# Patient Record
Sex: FEMALE | Race: WHITE | Employment: FULL TIME | URBAN - METROPOLITAN AREA
[De-identification: names, ages, dates, MRNs, and addresses within clinical notes are randomized per-mention and may not be internally consistent; named-entity substitution may affect disease eponyms.]

---

## 2019-01-22 ENCOUNTER — OFFICE VISIT (OUTPATIENT)
Dept: OBGYN CLINIC | Age: 54
End: 2019-01-22
Payer: COMMERCIAL

## 2019-01-22 VITALS
DIASTOLIC BLOOD PRESSURE: 62 MMHG | HEIGHT: 63 IN | SYSTOLIC BLOOD PRESSURE: 104 MMHG | WEIGHT: 156 LBS | HEART RATE: 68 BPM | BODY MASS INDEX: 27.64 KG/M2

## 2019-01-22 DIAGNOSIS — Z12.31 VISIT FOR SCREENING MAMMOGRAM: ICD-10-CM

## 2019-01-22 DIAGNOSIS — Z11.51 SCREENING FOR HPV (HUMAN PAPILLOMAVIRUS): ICD-10-CM

## 2019-01-22 DIAGNOSIS — Z01.419 VISIT FOR GYNECOLOGIC EXAMINATION: Primary | ICD-10-CM

## 2019-01-22 PROCEDURE — 99386 PREV VISIT NEW AGE 40-64: CPT | Performed by: OBSTETRICS & GYNECOLOGY

## 2019-01-22 RX ORDER — BISOPROLOL FUMARATE 5 MG/1
TABLET ORAL
Refills: 0 | COMMUNITY
Start: 2019-01-12

## 2019-01-31 LAB — PAP SMEAR: NORMAL

## 2019-02-18 ENCOUNTER — HOSPITAL ENCOUNTER (OUTPATIENT)
Dept: WOMENS IMAGING | Age: 54
Discharge: HOME | End: 2019-02-20
Payer: COMMERCIAL

## 2019-02-18 DIAGNOSIS — Z12.31 VISIT FOR SCREENING MAMMOGRAM: ICD-10-CM

## 2019-02-18 PROCEDURE — 77067 SCR MAMMO BI INCL CAD: CPT

## 2019-02-21 PROBLEM — Z12.31 VISIT FOR SCREENING MAMMOGRAM: Status: RESOLVED | Noted: 2019-01-22 | Resolved: 2019-02-21

## 2019-02-21 PROBLEM — Z01.419 VISIT FOR GYNECOLOGIC EXAMINATION: Status: RESOLVED | Noted: 2019-01-22 | Resolved: 2019-02-21

## 2019-02-21 PROBLEM — Z11.51 SCREENING FOR HPV (HUMAN PAPILLOMAVIRUS): Status: RESOLVED | Noted: 2019-01-22 | Resolved: 2019-02-21

## 2022-12-06 ENCOUNTER — OFFICE VISIT (OUTPATIENT)
Dept: OBGYN CLINIC | Age: 57
End: 2022-12-06
Payer: COMMERCIAL

## 2022-12-06 VITALS
DIASTOLIC BLOOD PRESSURE: 72 MMHG | WEIGHT: 144 LBS | HEART RATE: 64 BPM | BODY MASS INDEX: 25.52 KG/M2 | HEIGHT: 63 IN | SYSTOLIC BLOOD PRESSURE: 118 MMHG

## 2022-12-06 DIAGNOSIS — Z11.51 SCREENING FOR HPV (HUMAN PAPILLOMAVIRUS): ICD-10-CM

## 2022-12-06 DIAGNOSIS — Z01.419 VISIT FOR GYNECOLOGIC EXAMINATION: Primary | ICD-10-CM

## 2022-12-06 DIAGNOSIS — N94.10 DYSPAREUNIA, FEMALE: ICD-10-CM

## 2022-12-06 DIAGNOSIS — Z12.31 VISIT FOR SCREENING MAMMOGRAM: ICD-10-CM

## 2022-12-06 PROCEDURE — 99396 PREV VISIT EST AGE 40-64: CPT | Performed by: OBSTETRICS & GYNECOLOGY

## 2022-12-06 RX ORDER — MELOXICAM 15 MG/1
TABLET ORAL
COMMUNITY
Start: 2022-09-25

## 2022-12-06 RX ORDER — ESTRADIOL 0.1 MG/G
1 CREAM VAGINAL DAILY
Qty: 1 EACH | Refills: 6 | Status: SHIPPED | OUTPATIENT
Start: 2022-12-06

## 2022-12-06 NOTE — PROGRESS NOTES
Postmenopausal Annual    Subjective:     Keyanna Flaherty is a 61y.o. year old female    female who presents today for her annual well woman exam. The patient is sexually active. The patient is not taking hormone replacement therapy. Patient denies post-menopausal vaginal bleeding. The patient has regular exercise: yes  Mentions a point of tenderness at the introitus , has been hurting over the last year, affecting sexual activity . Vitals:  /72 (Site: Left Upper Arm, Position: Sitting, Cuff Size: Medium Adult)   Pulse 64   Ht 5' 3\" (1.6 m)   Wt 144 lb (65.3 kg)   BMI 25.51 kg/m²   Allergies:  Patient has no known allergies. No past medical history on file. Past Surgical History:   Procedure Laterality Date    HYSTERECTOMY, VAGINAL      KNEE SURGERY      left    OVARY REMOVAL      left after tubal preg     Family History   Problem Relation Age of Onset    Cancer Mother         breast     Social History     Socioeconomic History    Marital status:      Spouse name: Not on file    Number of children: Not on file    Years of education: Not on file    Highest education level: Not on file   Occupational History    Occupation: co ordinator   Tobacco Use    Smoking status: Former    Smokeless tobacco: Never   Substance and Sexual Activity    Alcohol use: Yes     Comment: social    Drug use: No    Sexual activity: Yes   Other Topics Concern    Not on file   Social History Narrative    Not on file     Social Determinants of Health     Financial Resource Strain: Not on file   Food Insecurity: Not on file   Transportation Needs: Not on file   Physical Activity: Not on file   Stress: Not on file   Social Connections: Not on file   Intimate Partner Violence: Not on file   Housing Stability: Not on file         Last mammogram 2 mnth - normal   Breast cancer risk factors : mother - 71 yo . Chemo       Last Pap 4 yrs normal     No LMP recorded. Patient has had a hysterectomy.       Age at menopause onset: early 46 s   Menopausal symptom assessment:  Vasomotor symptoms: occasional   Urinary incontinence &  symptoms: denies   Sexual dysfunction: denies   Present Hormonalmedications: none     Osteoporosis risk assessment : neg   Last bone mineral density : not indicated     History of abnormal lipids: yes - on meds   Hypertension - hx PVCs uses BP meds. Stroke/Joy    Yearly flu vaccine recommended for persons aged 48 and older. Colonoscopy up-to-date    Review of Systems  Review of Systems  All other systems reviewed and are negative. Review of Systems   Constitutional: Negative for chills and fever. Respiratory: Negative for cough and shortness of breath. Cardiovascular: Negative for chest painand palpitations. Gastrointestinal: Negative for nausea and vomiting. Genitourinary: Negative for dysuria, frequency and urgency. Musculoskeletal: Negative formyalgias. Neurological: Negative for dizziness, seizures and headaches. Psychiatric/Behavioral: Negative for depression and suicidal ideas.     :     Vitals:  /72 (Site: Left Upper Arm, Position: Sitting, Cuff Size: Medium Adult)   Pulse 64   Ht 5' 3\" (1.6 m)   Wt 144 lb (65.3 kg)   BMI 25.51 kg/m²     Physical Exam  Physical Exam    Constitutional: She is oriented to person, place, and time and well-developed, well-nourished, and in nodistress. HENT:   Head: Normocephalic and atraumatic. Eyes: Conjunctivae are normal. Pupils are equal, round, and reactive to light. Neck: Normal range ofmotion. Neck supple. Cardiovascular: Normal rate and regular rhythm. Pulmonary/Chest: Effort normal and breath sounds normal. No respiratory distress. Right breast exhibits noinverted nipple, no mass, no nipple discharge, no skin change and no tenderness. Left breast exhibits no inverted nipple, no mass, no nipple discharge, no skin change and no tenderness. Breasts are symmetrical.   Abdominal: Soft.  Bowel sounds are normal. Genitourinary: Vagina normal, uterus normal and cervix normal. No vaginal discharge found. Musculoskeletal: Normal range ofmotion. Neurological: She is alert and oriented to person, place, and time. She has normal reflexes. Psychiatric: Memory, affect andjudgment normal.       Assessment:      Diagnosis Orders   1. Visit for gynecologic examination        2. Screening for HPV (human papillomavirus)        3. Visit for screening mammogram        4. Dyspareunia, female            Body mass index is 25.51 kg/m². Obesity:  Overweight  Smoking:  No    Plan:   PAP SMEAR : indicated:  performed. Breast exam : Normal   Dyspareunia: Physical exam discussed with patient, no abnormalities noted on vaginal exam.  Discussed with patient symptoms of atrophic vaginitis, patient to start vaginal estrogen cream, and to follow-up in 3-month to check for resolution of symptoms. Patient also advised to use coconut oil also to be applied vaginally when the cream is not being used, as needed or even has a lubricant during intercourse due to its moisturizing properties and vitamin D content and bacteriostatic properties. Obesity Counseling:  Given  Smoking Counseling:N/A    No orders of the defined types were placed in this encounter. Orders Placed This Encounter   Medications    estradiol (ESTRACE VAGINAL) 0.1 MG/GM vaginal cream     Sig: Place 1 g vaginally daily     Dispense:  1 each     Refill:  6       Follow up:  Return in about 3 months (around 3/6/2023) for medication assessment. Seth Swanson MD        HEALTH MAINTENANCE:   Health information given. Women's Health patient information and counselling done. regarding risk/benefits/alternatives to Hormone Therapy and need for yearly re-evaluation. Periodic Pap smear discussed. Mammogram recommended yearly. Colon cancer screening by age 48 & every 5-10years.   Bone mineral density (by age 72 or sooner if indication it would affect Hormone Therapy management or other risk factors for osteoporosis). Aleena Emerson M.D., [email or web address]. G

## 2023-04-12 DIAGNOSIS — Z00.00 ENCOUNTER FOR GENERAL ADULT MEDICAL EXAMINATION WITHOUT ABNORMAL FINDINGS: ICD-10-CM

## 2023-04-12 DIAGNOSIS — E78.5 HYPERLIPIDEMIA, UNSPECIFIED: ICD-10-CM

## 2023-04-13 RX ORDER — ROSUVASTATIN CALCIUM 10 MG/1
TABLET, COATED ORAL
Qty: 90 TABLET | Refills: 1 | Status: SHIPPED | OUTPATIENT
Start: 2023-04-13 | End: 2023-06-16 | Stop reason: ALTCHOICE

## 2023-04-13 RX ORDER — ASPIRIN 81 MG/1
TABLET ORAL
Qty: 90 TABLET | Refills: 1 | Status: SHIPPED | OUTPATIENT
Start: 2023-04-13 | End: 2023-06-16 | Stop reason: ALTCHOICE

## 2023-04-14 NOTE — TELEPHONE ENCOUNTER
Please schedule an appointment for medicine is gone have sent in 90-day supply.  In the event that you cannot get in before this 90-day supply please call my office.Thanks much Dr. Larios.

## 2023-06-16 ENCOUNTER — OFFICE VISIT (OUTPATIENT)
Dept: PRIMARY CARE | Facility: CLINIC | Age: 58
End: 2023-06-16
Payer: COMMERCIAL

## 2023-06-16 VITALS
HEART RATE: 55 BPM | RESPIRATION RATE: 18 BRPM | TEMPERATURE: 97 F | SYSTOLIC BLOOD PRESSURE: 119 MMHG | BODY MASS INDEX: 26.51 KG/M2 | OXYGEN SATURATION: 97 % | DIASTOLIC BLOOD PRESSURE: 80 MMHG | WEIGHT: 149.6 LBS | HEIGHT: 63 IN

## 2023-06-16 DIAGNOSIS — Z12.31 BREAST CANCER SCREENING BY MAMMOGRAM: Primary | ICD-10-CM

## 2023-06-16 DIAGNOSIS — Z00.00 HEALTHCARE MAINTENANCE: ICD-10-CM

## 2023-06-16 DIAGNOSIS — Z80.8 FAMILY HISTORY OF MELANOMA: ICD-10-CM

## 2023-06-16 PROBLEM — M79.645 BILATERAL THUMB PAIN: Status: ACTIVE | Noted: 2023-06-16

## 2023-06-16 PROBLEM — E78.5 HYPERLIPIDEMIA: Status: ACTIVE | Noted: 2023-06-16

## 2023-06-16 PROBLEM — J93.9 PNEUMOTHORAX: Status: ACTIVE | Noted: 2023-06-16

## 2023-06-16 PROBLEM — L65.9 ALOPECIA: Status: ACTIVE | Noted: 2023-06-16

## 2023-06-16 PROBLEM — M79.644 BILATERAL THUMB PAIN: Status: ACTIVE | Noted: 2023-06-16

## 2023-06-16 PROBLEM — I49.8 BIGEMINY: Status: ACTIVE | Noted: 2023-06-16

## 2023-06-16 PROBLEM — M70.61 GREATER TROCHANTERIC BURSITIS OF BOTH HIPS: Status: ACTIVE | Noted: 2023-06-16

## 2023-06-16 PROBLEM — M70.62 GREATER TROCHANTERIC BURSITIS OF BOTH HIPS: Status: ACTIVE | Noted: 2023-06-16

## 2023-06-16 PROBLEM — R00.2 PALPITATIONS: Status: ACTIVE | Noted: 2023-06-16

## 2023-06-16 PROBLEM — R19.8 CHANGE IN BOWEL FUNCTION: Status: ACTIVE | Noted: 2023-06-16

## 2023-06-16 PROBLEM — E55.9 VITAMIN D DEFICIENCY: Status: ACTIVE | Noted: 2023-06-16

## 2023-06-16 PROCEDURE — 1036F TOBACCO NON-USER: CPT | Performed by: FAMILY MEDICINE

## 2023-06-16 PROCEDURE — 99396 PREV VISIT EST AGE 40-64: CPT | Performed by: FAMILY MEDICINE

## 2023-06-16 RX ORDER — BISOPROLOL FUMARATE 5 MG/1
5 TABLET, FILM COATED ORAL 2 TIMES DAILY
COMMUNITY
End: 2023-08-02

## 2023-06-16 NOTE — PATIENT INSTRUCTIONS
Please consider exercise program involving walking or some other form of aerobic activity 5 days weekly for 30 minutes... Let's also consider strengthening of large muscle groups like the abdominal muscles or shoulder muscles... Twice weekly with reps of 5/10/15 exercises and gradually increase strength.. This is not heavy strength training but light weight training... Sit ups or back exercise routine.. Please ask for handout if uncertain how to do..This  will help to strengthen your muscles which in turn will help you to lose weight.... You might ask what is the best diet available.. I would strongly encourage you to consider  Weight Watchers.. And as  your  fellow on  Weight Watchers physician attempting to  live this  LIFE  style  choice with you....  I will be glad to give you recommendations on what to eat.. Consider buying Sindhu bread.., kandis bagle thin bread.. oikos yogurt... eggs  to eat as hard-boiled... Halo top ice cream for snack... All these are delicious foods which.. when eaten and  being compliant eating three  meals daily  breakfast lunch and dinner, drinking  64 ounces of water daily we will all win together !!!!!!!. This will be a means for you to lose weight... Consider also the smart phone letty ... My plate.. Or My  fitness  pal..,  as additional possibilities for weight loss... Good  lucwendy Larios!    Discussed medication side effects.  The  risk benefits and treatment options  discussed with patient.       Please schedule follow-up appointment based upon your improvement/failure to improve/chronic medical conditions and physician recommendations during office appointment at the .       Patient advised to go to er if symptoms worsen or to call answering service, or to return to office for additional evaluation    This note was partially  generated using Dragon voice recognition and there may be incorrect words, wording, spelling, or pronunciation errors that were not  corrected prior to committing the note to the medical record.     Please consider the following medications to help    mitigate  Covid during this time  Vitamin C 500 mg  TWICE daily  B complex daily  ZINC   30 - 50  MG  DAILY     VITAMIN D 3   200O IU DAILY        Multivitamin with zinc      Cologuard dated 8/8/2022 was negative.  Lipids dated 4/14/2021 with cholesterol 152 HDL 63 LDL 66 triglycerides 113 Numbers outstanding hepatitis C antibody was nonreactive same date vitamin D same date was 66 with magnesium 2.0 CBC with differential with no anemia WBC 6.0 platelets 259 and CMP showing normal liver function studies normal GFR complete physical examination okay

## 2023-06-16 NOTE — PROGRESS NOTES
Jasper Curran is a 58 y.o. female here today for  complete physical examination     HPI   Here for complete physical exam patient denies complaints at this time examination    Current Outpatient Medications:     bisoprolol (Zebeta) 5 mg tablet, Take 1 tablet (5 mg) by mouth 2 times a day., Disp: , Rfl:     Past Medical History:   Diagnosis Date    Hyperlipidemia     Hypertension        Past Surgical History:   Procedure Laterality Date    KNEE SURGERY      OTHER SURGICAL HISTORY  12/31/2019    Hysterectomy       Family History   Problem Relation Name Age of Onset    Breast cancer Mother      Melanoma Father      Thyroid cancer Sister         Social History     Tobacco Use    Smoking status: Never    Smokeless tobacco: Never   Vaping Use    Vaping Use: Never used   Substance Use Topics    Alcohol use: Yes     Alcohol/week: 3.0 standard drinks of alcohol     Types: 3 Cans of beer per week    Drug use: Never       Immunization History   Administered Date(s) Administered    Pfizer Purple Cap SARS-CoV-2 03/19/2021, 04/09/2021, 11/23/2021   Constitutional; no fever no chills no recent weight gain and no recent weight loss.  eyes:; no loss of vision no discharge from the eyes and no itching of the eyes.  ENT; no neck pain symptoms no ear symptoms and no nasal symptoms.  Cardiovascular; no chest pain no palpitations and no lower extremity edema.  Respiratory; no shortness of breath no cough and no shortness of breath during exertion.  gastrointestinal; no abdominal pain no constipation no heartburn no vomiting no blood in stools and bowel movement frequency normal.  genitourinary; negative dysuria no hematuria and no pyuria.  Musculoskeletal; no arthralgias and no myalgias.  integumentary;.. No skin lesions  Neurologic. no headaches and no dizziness  hematologic/lymphatic; swollen glands no tendency for easy bleeding and no tendency for easy bruising  Psychiatric; no anxiety no depression and no emotional problems.        Objective    Visit Vitals  /80   Pulse 55   Temp 36.1 °C (97 °F)   Resp 18       Physical Exam   Vitals: I have reviewed the vitals  General: Well-developed.  In no acute distress.  Eyes:   sclera nonicteric.  Conjunctiva not injected.  No discharge.   HEAD: Normocephalic, atraumatic.  HEENT   Mucous membranes moist.  Posterior oropharynx nonerythematous, no tonsillar exudates.      No cervical lymphadenopathy.  Cardio: Regular rate and rhythm.  No murmur, rub or gallop.  Pulmonary: Lungs clear to auscultation in all fields.  No accessory muscle use.  GI/: Normal active bowel sounds.  Soft, nontender.  No masses or organomegaly appreciated.  Musculoskeletal: No gross deformities appreciated.  Neuro: Alert, age-appropriate.  Normal muscle tone.  Moving all extremities.  Skin: No rash, bruises or lesions.     Assessment      E-cigarette/Vaping       Questions Responses    E-cigarette/Vaping Use Never User    Passive Exposure No    Counseling Given No          E-cigarette/Vaping Substances       Questions Responses    Nicotine No    THC No    CBD No    Flavoring No          E-cigarette/Vaping Devices       Questions Responses    Disposable No    Pre-filled or Refillable Cartridge No    Refillable Tank No    Pre-filled Pod No        Cologuard dated 8/8/2022 was negative.  Lipids dated 4/14/2021 with cholesterol 152 HDL 63 LDL 66 triglycerides 113 Numbers outstanding hepatitis C antibody was nonreactive same date vitamin D same date was 66 with magnesium 2.0 CBC with differential with no anemia WBC 6.0 platelets 259 and CMP showing normal liver function studies normal GFR complete physical examination okay    Assess/Plan SmartLinks:   Problem List Items Addressed This Visit       Healthcare maintenance    Relevant Orders    Lipid Panel    Glucose, fasting     Other Visit Diagnoses       Breast cancer screening by mammogram    -  Primary    Relevant Orders    BI mammo bilateral screening tomosynthesis     Family history of melanoma        Relevant Orders    Referral to Dermatology                 Airway patent, Nasal mucosa clear. Mouth with normal mucosa. Throat has no vesicles, no oropharyngeal exudates and uvula is midline.

## 2023-07-27 NOTE — RESULT ENCOUNTER NOTE
PLEASE CALL Jasper Curran AND LET HER KNOW THE MAMMOGRAM IS NORMAL.  PLEASE PLAN ON REPEATING IN ONE YEAR.

## 2023-08-02 DIAGNOSIS — R00.2 PALPITATIONS: ICD-10-CM

## 2023-08-02 PROBLEM — I49.1 PAC (PREMATURE ATRIAL CONTRACTION): Status: ACTIVE | Noted: 2020-04-02

## 2023-08-02 RX ORDER — MELOXICAM 15 MG/1
1 TABLET ORAL DAILY
COMMUNITY
Start: 2022-09-25

## 2023-08-02 RX ORDER — ASPIRIN 81 MG/1
81 TABLET ORAL
COMMUNITY

## 2023-08-02 RX ORDER — BISOPROLOL FUMARATE 5 MG/1
5 TABLET, FILM COATED ORAL 2 TIMES DAILY
Qty: 180 TABLET | Refills: 1 | Status: SHIPPED | OUTPATIENT
Start: 2023-08-02 | End: 2024-02-12

## 2023-08-02 RX ORDER — ROSUVASTATIN CALCIUM 5 MG/1
1 TABLET, COATED ORAL DAILY
COMMUNITY
Start: 2022-08-31

## 2023-08-02 RX ORDER — MULTIVITAMIN
1 TABLET ORAL
COMMUNITY

## 2024-02-12 DIAGNOSIS — R00.2 PALPITATIONS: ICD-10-CM

## 2024-02-12 RX ORDER — BISOPROLOL FUMARATE 5 MG/1
5 TABLET, FILM COATED ORAL 2 TIMES DAILY
Qty: 180 TABLET | Refills: 1 | Status: SHIPPED | OUTPATIENT
Start: 2024-02-12

## 2024-07-08 ENCOUNTER — APPOINTMENT (OUTPATIENT)
Dept: PRIMARY CARE | Facility: CLINIC | Age: 59
End: 2024-07-08
Payer: COMMERCIAL

## 2024-07-08 VITALS
SYSTOLIC BLOOD PRESSURE: 124 MMHG | DIASTOLIC BLOOD PRESSURE: 73 MMHG | HEIGHT: 63 IN | RESPIRATION RATE: 18 BRPM | OXYGEN SATURATION: 97 % | BODY MASS INDEX: 26.5 KG/M2 | HEART RATE: 55 BPM | TEMPERATURE: 97 F

## 2024-07-08 DIAGNOSIS — D22.9 CHANGE IN COLOR OF SKIN MOLE: ICD-10-CM

## 2024-07-08 DIAGNOSIS — Z00.00 HEALTHCARE MAINTENANCE: Primary | ICD-10-CM

## 2024-07-08 PROCEDURE — 99396 PREV VISIT EST AGE 40-64: CPT | Performed by: FAMILY MEDICINE

## 2024-07-08 PROCEDURE — 1036F TOBACCO NON-USER: CPT | Performed by: FAMILY MEDICINE

## 2024-07-08 ASSESSMENT — ENCOUNTER SYMPTOMS
ARTHRALGIAS: 1
DIARRHEA: 0
HEADACHES: 0
SEIZURES: 0
RECTAL PAIN: 0
WHEEZING: 0
SORE THROAT: 0
POLYPHAGIA: 0
NECK PAIN: 0
SINUS PAIN: 0
ABDOMINAL PAIN: 0
JOINT SWELLING: 0
LIGHT-HEADEDNESS: 0
FATIGUE: 0
CONSTIPATION: 0
TREMORS: 0
BACK PAIN: 0
PHOTOPHOBIA: 0
HEMATURIA: 0
DIZZINESS: 0
RHINORRHEA: 0
PALPITATIONS: 1
CHEST TIGHTNESS: 0
POLYDIPSIA: 0
BLOOD IN STOOL: 0
BRUISES/BLEEDS EASILY: 0
APNEA: 0
DYSURIA: 0
COUGH: 0
FREQUENCY: 0
CHOKING: 0

## 2024-07-08 NOTE — PROGRESS NOTES
Jasper Curran is a 59 y.o. female here today a periodic health exam.  I reviewed previous preventative health measures including screening tests, immunizations and labs.      HPI   CURRENT COMPLAINTS OR CONCERNS:         PREVIOUS PREVENTATIVE HEALTH  Colonoscopy : NO  Cologuard : YES -- DATE 8/22  Mammogram : YES -- DATE 7/23  Pap Test :  YES -- DATE 12/22  Hepatitis C Antibody : YES -- DATE 12/21  HIV Screening : NO  Prevnar : NO  Tdap : YES -- DATE 2015  Shingrix : NO  Yearly Flu Shot : NO    CURRENT FINDINGS  Healthy Diet : YES  Exercise :  NO  Depression Issues : NO  Alcohol Use : YES --  weekend  Tobacco Use : NO        Current Outpatient Medications:     bisoprolol (Zebeta) 5 mg tablet, TAKE 1 TABLET BY MOUTH TWICE A DAY, Disp: 180 tablet, Rfl: 1    multivitamin tablet, Take 1 tablet by mouth once daily., Disp: , Rfl:     Past Medical History:   Diagnosis Date    Hyperlipidemia     Hypertension     Palpitation        Past Surgical History:   Procedure Laterality Date    KNEE SURGERY      OTHER SURGICAL HISTORY  12/31/2019    Hysterectomy    PLEURAL SCARIFICATION         Family History   Problem Relation Name Age of Onset    Breast cancer Mother      Melanoma Father      Thyroid cancer Sister         Social History     Tobacco Use    Smoking status: Never    Smokeless tobacco: Former   Vaping Use    Vaping status: Never Used   Substance Use Topics    Alcohol use: Yes     Alcohol/week: 3.0 standard drinks of alcohol     Types: 3 Cans of beer per week     Comment: social    Drug use: Never       Immunization History   Administered Date(s) Administered    Pfizer Purple Cap SARS-CoV-2 03/19/2021, 04/09/2021, 11/23/2021       Review of Systems   Constitutional:  Negative for fatigue.   HENT:  Negative for congestion, dental problem, ear pain, nosebleeds, rhinorrhea, sinus pain and sore throat.    Eyes:  Negative for photophobia.   Respiratory:  Negative for apnea, cough, choking, chest tightness and wheezing.     Cardiovascular:  Positive for palpitations. Negative for chest pain.   Gastrointestinal:  Negative for abdominal pain, blood in stool, constipation, diarrhea and rectal pain.   Endocrine: Negative for cold intolerance, heat intolerance, polydipsia, polyphagia and polyuria.   Genitourinary:  Negative for dysuria, frequency, hematuria, pelvic pain and vaginal discharge.   Musculoskeletal:  Positive for arthralgias. Negative for back pain, joint swelling and neck pain.   Skin:  Negative for rash.   Allergic/Immunologic: Negative for food allergies.   Neurological:  Negative for dizziness, tremors, seizures, syncope, light-headedness and headaches.   Hematological:  Does not bruise/bleed easily.         Objective    Visit Vitals  /73   Pulse 55   Temp 36.1 °C (97 °F)   Resp 18       Physical Exam  Vitals reviewed.   Constitutional:       Appearance: Normal appearance.   HENT:      Head: Normocephalic.      Right Ear: External ear normal.      Left Ear: External ear normal.      Nose: Nose normal.      Mouth/Throat:      Mouth: Mucous membranes are moist.   Eyes:      Conjunctiva/sclera: Conjunctivae normal.   Cardiovascular:      Rate and Rhythm: Regular rhythm.      Heart sounds: Normal heart sounds.   Pulmonary:      Effort: Pulmonary effort is normal.      Breath sounds: Normal breath sounds.   Abdominal:      General: Bowel sounds are normal.      Palpations: Abdomen is soft.   Musculoskeletal:         General: Normal range of motion.      Cervical back: Neck supple.   Skin:     General: Skin is warm and dry.   Neurological:      General: No focal deficit present.      Mental Status: She is alert and oriented to person, place, and time.   Psychiatric:         Behavior: Behavior normal.         Judgment: Judgment normal.            Assessment    1. Healthcare maintenance  Lipid Panel, CBC and Auto Differential, Glucose, fasting, Comprehensive Metabolic Panel      2. Change in color of skin mole  Referral to  Dermatology

## 2024-07-08 NOTE — PATIENT INSTRUCTIONS

## 2024-08-30 DIAGNOSIS — R00.2 PALPITATIONS: ICD-10-CM

## 2024-08-30 RX ORDER — BISOPROLOL FUMARATE 5 MG/1
5 TABLET, FILM COATED ORAL 2 TIMES DAILY
Qty: 180 TABLET | Refills: 1 | Status: SHIPPED | OUTPATIENT
Start: 2024-08-30

## 2025-02-28 DIAGNOSIS — R00.2 PALPITATIONS: ICD-10-CM

## 2025-02-28 RX ORDER — BISOPROLOL FUMARATE 5 MG/1
5 TABLET, FILM COATED ORAL 2 TIMES DAILY
Qty: 180 TABLET | Refills: 1 | Status: SHIPPED | OUTPATIENT
Start: 2025-02-28

## 2025-07-16 ENCOUNTER — APPOINTMENT (OUTPATIENT)
Dept: PRIMARY CARE | Facility: CLINIC | Age: 60
End: 2025-07-16
Payer: COMMERCIAL

## 2025-07-16 VITALS
RESPIRATION RATE: 18 BRPM | SYSTOLIC BLOOD PRESSURE: 106 MMHG | OXYGEN SATURATION: 98 % | WEIGHT: 143 LBS | TEMPERATURE: 97 F | HEART RATE: 61 BPM | HEIGHT: 63 IN | DIASTOLIC BLOOD PRESSURE: 66 MMHG | BODY MASS INDEX: 25.34 KG/M2

## 2025-07-16 DIAGNOSIS — R10.12 LEFT UPPER QUADRANT ABDOMINAL PAIN: ICD-10-CM

## 2025-07-16 DIAGNOSIS — Z00.00 HEALTHCARE MAINTENANCE: ICD-10-CM

## 2025-07-16 DIAGNOSIS — I49.1 PAC (PREMATURE ATRIAL CONTRACTION): Primary | ICD-10-CM

## 2025-07-16 DIAGNOSIS — Z12.31 BREAST CANCER SCREENING BY MAMMOGRAM: ICD-10-CM

## 2025-07-16 DIAGNOSIS — Z12.11 SCREENING FOR COLON CANCER: ICD-10-CM

## 2025-07-16 LAB
POC BILIRUBIN, URINE: NEGATIVE
POC BLOOD, URINE: NEGATIVE
POC COLOR, URINE: YELLOW
POC GLUCOSE, URINE: NEGATIVE MG/DL
POC KETONES, URINE: NEGATIVE MG/DL
POC LEUKOCYTES, URINE: NEGATIVE
POC NITRITE,URINE: NEGATIVE
POC PH, URINE: 6 PH
POC PROTEIN, URINE: NEGATIVE MG/DL
POC SPECIFIC GRAVITY, URINE: 1.02
POC UROBILINOGEN, URINE: 0.2 EU/DL

## 2025-07-16 PROCEDURE — 99214 OFFICE O/P EST MOD 30 MIN: CPT | Performed by: FAMILY MEDICINE

## 2025-07-16 PROCEDURE — 3008F BODY MASS INDEX DOCD: CPT | Performed by: FAMILY MEDICINE

## 2025-07-16 PROCEDURE — 99396 PREV VISIT EST AGE 40-64: CPT | Performed by: FAMILY MEDICINE

## 2025-07-16 PROCEDURE — 81003 URINALYSIS AUTO W/O SCOPE: CPT | Performed by: FAMILY MEDICINE

## 2025-07-16 PROCEDURE — 90471 IMMUNIZATION ADMIN: CPT | Performed by: FAMILY MEDICINE

## 2025-07-16 PROCEDURE — 93000 ELECTROCARDIOGRAM COMPLETE: CPT | Performed by: FAMILY MEDICINE

## 2025-07-16 PROCEDURE — 90715 TDAP VACCINE 7 YRS/> IM: CPT | Performed by: FAMILY MEDICINE

## 2025-07-16 RX ORDER — FAMOTIDINE 20 MG/1
20 TABLET, FILM COATED ORAL 2 TIMES DAILY
Qty: 60 TABLET | Refills: 5 | Status: SHIPPED | OUTPATIENT
Start: 2025-07-16 | End: 2026-01-12

## 2025-07-16 ASSESSMENT — ENCOUNTER SYMPTOMS
HEMATURIA: 0
FATIGUE: 0
SORE THROAT: 0
DIARRHEA: 0
CHEST TIGHTNESS: 0
LIGHT-HEADEDNESS: 1
ABDOMINAL PAIN: 1
BLOOD IN STOOL: 0
PALPITATIONS: 1
VOMITING: 0
HEADACHES: 0
ARTHRALGIAS: 0
SINUS PAIN: 0
BELCHING: 0
FLANK PAIN: 0
BRUISES/BLEEDS EASILY: 0
DYSURIA: 0
WEIGHT LOSS: 0
FREQUENCY: 0
BACK PAIN: 0
POLYDIPSIA: 0
COUGH: 0
FLATUS: 0
NAUSEA: 0
POLYPHAGIA: 0
DIZZINESS: 0
NERVOUS/ANXIOUS: 0

## 2025-07-16 ASSESSMENT — PATIENT HEALTH QUESTIONNAIRE - PHQ9
2. FEELING DOWN, DEPRESSED OR HOPELESS: NOT AT ALL
SUM OF ALL RESPONSES TO PHQ9 QUESTIONS 1 AND 2: 0
1. LITTLE INTEREST OR PLEASURE IN DOING THINGS: NOT AT ALL

## 2025-07-16 NOTE — PATIENT INSTRUCTIONS

## 2025-07-16 NOTE — PROGRESS NOTES
Jasper Curran is a 60 y.o. female here today a periodic health exam.  I reviewed previous preventative health measures including screening tests, immunizations and labs.      Abdominal Pain  This is a recurrent problem. The current episode started more than 1 year ago. The onset quality is gradual. The problem occurs daily. The problem has been unchanged. The pain is located in the epigastric region. The pain is at a severity of 1/10. The quality of the pain is burning. Pertinent negatives include no arthralgias, belching, diarrhea, dysuria, flatus, frequency, headaches, hematuria, melena, nausea, vomiting or weight loss. The pain is aggravated by certain positions. The pain is relieved by Nothing. She has tried nothing for the symptoms.      CURRENT COMPLAINTS OR CONCERNS:   Skin abrasion right hand question last tetanus  Would like labs done as recommended by annual physical for work  Admits to palpitations    PREVIOUS PREVENTATIVE HEALTH  Colonoscopy : NO  Cologuard : YES -- DATE 8/8/2022  Mammogram : YES -- DATE 6/16/2023  Pap Test :  YES -- DATE 12/22 with Pap performed  Hepatitis C Antibody : YES -- DATE 4/14/2021  HIV Screening : NO  Prevnar : NO  Tdap : YES -- DATE 9/11/15  Shingrix : NO  Yearly Flu Shot : NO    CURRENT FINDINGS  Healthy Diet : YES  Exercise :  NO ...walking dog    Depression Issues : NO  Alcohol Use : YES --  3 social   Tobacco Use : NO      Current Medications[1]    Medical History[2]    Surgical History[3]    Family History[4]    Social History[5]    Immunization History   Administered Date(s) Administered    COVID-19, mRNA, LNP-S, PF, 30 mcg/0.3 mL dose 03/19/2021, 04/09/2021, 11/23/2021       Review of Systems   Constitutional:  Negative for fatigue and weight loss.   HENT:  Negative for ear pain, sinus pain and sore throat.    Respiratory:  Negative for cough and chest tightness.    Cardiovascular:  Positive for palpitations. Negative for chest pain.   Gastrointestinal:  Positive for  abdominal pain. Negative for blood in stool, diarrhea, flatus, melena, nausea and vomiting.   Endocrine: Negative for cold intolerance, heat intolerance, polydipsia, polyphagia and polyuria.   Genitourinary:  Negative for dysuria, flank pain, frequency and hematuria.   Musculoskeletal:  Negative for arthralgias and back pain.   Skin:  Negative for rash.   Neurological:  Positive for light-headedness. Negative for dizziness, syncope and headaches.   Hematological:  Does not bruise/bleed easily.   Psychiatric/Behavioral:  The patient is not nervous/anxious.          Objective    Visit Vitals  /66   Pulse 61   Temp 36.1 °C (97 °F)   Resp 18       Physical Exam  Vitals reviewed.   Constitutional:       Appearance: Normal appearance.   HENT:      Head: Normocephalic.      Right Ear: External ear normal.      Left Ear: External ear normal.      Nose: Nose normal.      Mouth/Throat:      Mouth: Mucous membranes are moist.     Eyes:      Conjunctiva/sclera: Conjunctivae normal.       Cardiovascular:      Rate and Rhythm: Regular rhythm.      Heart sounds: Normal heart sounds.   Pulmonary:      Effort: Pulmonary effort is normal.      Breath sounds: Normal breath sounds.   Abdominal:      General: Bowel sounds are normal.      Palpations: Abdomen is soft.     Musculoskeletal:         General: Normal range of motion.      Cervical back: Neck supple.     Skin:     General: Skin is warm and dry.     Neurological:      General: No focal deficit present.      Mental Status: She is alert and oriented to person, place, and time.     Psychiatric:         Behavior: Behavior normal.         Judgment: Judgment normal.            Assessment    1. PAC (premature atrial contraction)        2. Left upper quadrant abdominal pain  CBC and Auto Differential, Amylase, Lipase, Comprehensive Metabolic Panel, US abdomen complete, POCT UA Automated manually resulted, XR abdomen 1 view, Sedimentation Rate, CBC and Auto Differential, Amylase,  Lipase, Comprehensive Metabolic Panel, Sedimentation Rate      3. Healthcare maintenance  Glucose, fasting, Lipid panel, Glucose, fasting, Lipid panel, Referral to Gynecology      4. Screening for colon cancer  Colonoscopy Screening; High Risk Patient      5. Breast cancer screening by mammogram  BI mammo bilateral screening tomosynthesis      Monitor palpitations if persistent or recurrent additional workup avoid caffeine    Blood  pressure  is   stable Monitor your blood pressure at home and notify if blood pressure consistently over 140 systolic 90 diastolic   See dentist twice yearly  Get checked yearly  30 minutes of moderate intensity exercise 5 days weekly  Application of sunscreen every 2 hours during peak times  Regular sleep schedule  At least 8 hours nightly of sleep..  Limit TV/screen time 30 minutes prior to bedtime   EKG shows  Normal sinus rhythm  Normal axis  Nonspecific ST segment changes  Poor R wave progression       [1]   Current Outpatient Medications:     bisoprolol (Zebeta) 5 mg tablet, Take 1 tablet (5 mg) by mouth 2 times a day., Disp: 180 tablet, Rfl: 1    multivitamin tablet, Take 1 tablet by mouth once daily., Disp: , Rfl:   [2]   Past Medical History:  Diagnosis Date    Hyperlipidemia     Hypertension     Palpitation    [3]   Past Surgical History:  Procedure Laterality Date    KNEE SURGERY      OTHER SURGICAL HISTORY  2019    Hysterectomy    PLEURAL SCARIFICATION     [4]   Family History  Problem Relation Name Age of Onset    Breast cancer Mother      Melanoma Father      Thyroid cancer Sister     [5]   Social History  Tobacco Use    Smoking status: Former     Current packs/day: 0.00     Average packs/day: 1 pack/day for 19.0 years (19.0 ttl pk-yrs)     Types: Cigarettes     Start date:      Quit date:      Years since quittin.5    Smokeless tobacco: Never   Vaping Use    Vaping status: Never Used   Substance Use Topics    Alcohol use: Yes     Alcohol/week: 3.0  standard drinks of alcohol     Types: 3 Cans of beer per week     Comment: social    Drug use: Never

## 2025-07-16 NOTE — ASSESSMENT & PLAN NOTE
Patient has left upper quadrant abdominal pain about years duration fairly constant sometimes he has palpitations may or may not be associated EKG ordered with results showing findings of normal sinus rhythm axis nonspecific ST segment changes no definite change in comparison with EKG dated i.e. old EKG.

## 2025-07-21 ENCOUNTER — APPOINTMENT (OUTPATIENT)
Dept: RADIOLOGY | Facility: CLINIC | Age: 60
End: 2025-07-21
Payer: COMMERCIAL

## 2025-07-21 ENCOUNTER — HOSPITAL ENCOUNTER (OUTPATIENT)
Dept: RADIOLOGY | Facility: CLINIC | Age: 60
Discharge: HOME | End: 2025-07-21
Payer: COMMERCIAL

## 2025-07-21 DIAGNOSIS — R10.12 LEFT UPPER QUADRANT ABDOMINAL PAIN: ICD-10-CM

## 2025-07-21 PROCEDURE — 76700 US EXAM ABDOM COMPLETE: CPT | Performed by: RADIOLOGY

## 2025-07-21 PROCEDURE — 74018 RADEX ABDOMEN 1 VIEW: CPT

## 2025-07-21 PROCEDURE — 74018 RADEX ABDOMEN 1 VIEW: CPT | Performed by: STUDENT IN AN ORGANIZED HEALTH CARE EDUCATION/TRAINING PROGRAM

## 2025-07-21 PROCEDURE — 76700 US EXAM ABDOM COMPLETE: CPT

## 2025-07-25 DIAGNOSIS — R10.12 LEFT UPPER QUADRANT ABDOMINAL PAIN: Primary | ICD-10-CM

## 2025-07-25 RX ORDER — OMEPRAZOLE 40 MG/1
40 CAPSULE, DELAYED RELEASE ORAL
Qty: 30 CAPSULE | Refills: 2 | Status: SHIPPED | OUTPATIENT
Start: 2025-07-25 | End: 2025-08-24

## 2025-07-25 NOTE — PROGRESS NOTES
I WILL PUT YOU  ON  OMEPRAZOLE  .... IM LIGHT OF CONSTIPATION   NOTED ON XRAY AND  PAIN  GET COLONOSCOPY ...       YOU MAY  NEED AND  EGD   ...  I WILL ADD OMPEPRAZOLE 40 MG  TABLET         STOP  PEPCID  AND FOLLOW UP AFTR COLONOSCOPY  IF WITHIN 30 DAYS  .. OTHERWISE  FOLLOW UP AFTER 30 DAYS OF  OMEPRAZOLE...  IF PERSISTENT PAIN  WE MAY WANT TO GET EGDS. SCHEDULE 30 DAY APPOINMNET  .. STOP  PEPCID  AND TAKE  OMEPRAZOLE  INSTEAD  ,, SENT  RX TO LOCAL PHARMACY

## 2025-08-16 DIAGNOSIS — R10.12 LEFT UPPER QUADRANT ABDOMINAL PAIN: ICD-10-CM

## 2025-08-18 ENCOUNTER — APPOINTMENT (OUTPATIENT)
Dept: RADIOLOGY | Facility: CLINIC | Age: 60
End: 2025-08-18
Payer: COMMERCIAL

## 2025-08-20 RX ORDER — OMEPRAZOLE 40 MG/1
40 CAPSULE, DELAYED RELEASE ORAL
Qty: 90 CAPSULE | Refills: 1 | Status: SHIPPED | OUTPATIENT
Start: 2025-08-20

## 2025-08-30 DIAGNOSIS — R00.2 PALPITATIONS: ICD-10-CM

## 2025-09-02 RX ORDER — BISOPROLOL FUMARATE 5 MG/1
5 TABLET, FILM COATED ORAL 2 TIMES DAILY
Qty: 180 TABLET | Refills: 1 | Status: SHIPPED | OUTPATIENT
Start: 2025-09-02

## 2025-09-09 ENCOUNTER — APPOINTMENT (OUTPATIENT)
Dept: RADIOLOGY | Facility: CLINIC | Age: 60
End: 2025-09-09
Payer: COMMERCIAL